# Patient Record
Sex: MALE | Race: WHITE | Employment: FULL TIME | URBAN - METROPOLITAN AREA
[De-identification: names, ages, dates, MRNs, and addresses within clinical notes are randomized per-mention and may not be internally consistent; named-entity substitution may affect disease eponyms.]

---

## 2017-03-03 PROBLEM — J30.1 SEASONAL ALLERGIC RHINITIS DUE TO POLLEN: Status: ACTIVE | Noted: 2017-03-03

## 2017-03-05 PROBLEM — R79.89 ELEVATED LFTS: Status: ACTIVE | Noted: 2017-03-05

## 2017-03-13 PROBLEM — D69.6 THROMBOCYTOPENIA (HCC): Status: ACTIVE | Noted: 2017-03-13

## 2017-08-23 PROBLEM — N52.9 ERECTILE DYSFUNCTION: Status: ACTIVE | Noted: 2017-08-23

## 2017-08-25 PROBLEM — E23.0 HYPOGONADOTROPIC HYPOGONADISM (HCC): Status: ACTIVE | Noted: 2017-08-25

## 2017-09-07 ENCOUNTER — HOSPITAL ENCOUNTER (OUTPATIENT)
Dept: MRI IMAGING | Age: 42
Discharge: HOME | End: 2017-09-07
Attending: INTERNAL MEDICINE
Payer: COMMERCIAL

## 2017-09-07 DIAGNOSIS — E23.0 HYPOGONADOTROPIC HYPOGONADISM (HCC): ICD-10-CM

## 2017-09-07 PROCEDURE — 70553 MRI BRAIN STEM W/O & W/DYE: CPT

## 2017-09-07 PROCEDURE — A9577 INJ MULTIHANCE: HCPCS | Performed by: INTERNAL MEDICINE

## 2017-09-07 PROCEDURE — 74011000258 HC RX REV CODE- 258: Performed by: INTERNAL MEDICINE

## 2017-09-07 PROCEDURE — 74011250636 HC RX REV CODE- 250/636: Performed by: INTERNAL MEDICINE

## 2017-09-07 RX ORDER — SODIUM CHLORIDE 0.9 % (FLUSH) 0.9 %
10 SYRINGE (ML) INJECTION
Status: COMPLETED | OUTPATIENT
Start: 2017-09-07 | End: 2017-09-07

## 2017-09-07 RX ADMIN — Medication 10 ML: at 16:56

## 2017-09-07 RX ADMIN — GADOBENATE DIMEGLUMINE 20 ML: 529 INJECTION, SOLUTION INTRAVENOUS at 16:55

## 2017-09-07 RX ADMIN — SODIUM CHLORIDE 100 ML: 900 INJECTION, SOLUTION INTRAVENOUS at 16:55

## 2017-12-11 PROBLEM — R53.82 CHRONIC FATIGUE: Status: ACTIVE | Noted: 2017-12-11

## 2018-09-21 PROBLEM — E66.01 SEVERE OBESITY (BMI 35.0-39.9): Status: ACTIVE | Noted: 2018-09-21

## 2019-07-22 PROBLEM — E66.01 SEVERE OBESITY (BMI 35.0-39.9): Status: RESOLVED | Noted: 2018-09-21 | Resolved: 2019-07-22

## 2019-07-22 PROBLEM — R79.89 ELEVATED LFTS: Status: RESOLVED | Noted: 2017-03-05 | Resolved: 2019-07-22

## 2020-01-29 ENCOUNTER — HOSPITAL ENCOUNTER (OUTPATIENT)
Dept: LAB | Age: 45
Discharge: HOME | End: 2020-01-29

## 2020-01-29 PROCEDURE — 88305 TISSUE EXAM BY PATHOLOGIST: CPT

## 2022-03-18 PROBLEM — J30.1 SEASONAL ALLERGIC RHINITIS DUE TO POLLEN: Status: ACTIVE | Noted: 2017-03-03

## 2022-03-19 PROBLEM — D69.6 THROMBOCYTOPENIA (HCC): Status: ACTIVE | Noted: 2017-03-13

## 2022-03-19 PROBLEM — N52.9 ERECTILE DYSFUNCTION: Status: ACTIVE | Noted: 2017-08-23

## 2022-03-19 PROBLEM — R53.82 CHRONIC FATIGUE: Status: ACTIVE | Noted: 2017-12-11

## 2022-03-20 PROBLEM — E23.0 HYPOGONADOTROPIC HYPOGONADISM (HCC): Status: ACTIVE | Noted: 2017-08-25

## 2024-06-22 ENCOUNTER — HOSPITAL ENCOUNTER (EMERGENCY)
Age: 49
Discharge: HOME | End: 2024-06-22
Attending: EMERGENCY MEDICINE
Payer: COMMERCIAL

## 2024-06-22 ENCOUNTER — APPOINTMENT (OUTPATIENT)
Dept: GENERAL RADIOLOGY | Age: 49
End: 2024-06-22
Payer: COMMERCIAL

## 2024-06-22 VITALS
TEMPERATURE: 97.6 F | BODY MASS INDEX: 32.9 KG/M2 | SYSTOLIC BLOOD PRESSURE: 132 MMHG | WEIGHT: 235 LBS | OXYGEN SATURATION: 95 % | DIASTOLIC BLOOD PRESSURE: 73 MMHG | RESPIRATION RATE: 13 BRPM | HEART RATE: 58 BPM | HEIGHT: 71 IN

## 2024-06-22 DIAGNOSIS — S81.812A LACERATION OF LEFT LOWER EXTREMITY, INITIAL ENCOUNTER: Primary | ICD-10-CM

## 2024-06-22 PROCEDURE — 2500000003 HC RX 250 WO HCPCS: Performed by: EMERGENCY MEDICINE

## 2024-06-22 PROCEDURE — 90714 TD VACC NO PRESV 7 YRS+ IM: CPT | Performed by: EMERGENCY MEDICINE

## 2024-06-22 PROCEDURE — 6360000002 HC RX W HCPCS: Performed by: EMERGENCY MEDICINE

## 2024-06-22 PROCEDURE — 73590 X-RAY EXAM OF LOWER LEG: CPT

## 2024-06-22 PROCEDURE — 6370000000 HC RX 637 (ALT 250 FOR IP): Performed by: EMERGENCY MEDICINE

## 2024-06-22 PROCEDURE — 90471 IMMUNIZATION ADMIN: CPT | Performed by: EMERGENCY MEDICINE

## 2024-06-22 PROCEDURE — 12034 INTMD RPR S/TR/EXT 7.6-12.5: CPT

## 2024-06-22 PROCEDURE — 99284 EMERGENCY DEPT VISIT MOD MDM: CPT

## 2024-06-22 RX ORDER — CEPHALEXIN 500 MG/1
500 CAPSULE ORAL
Status: COMPLETED | OUTPATIENT
Start: 2024-06-22 | End: 2024-06-22

## 2024-06-22 RX ORDER — LIDOCAINE HYDROCHLORIDE AND EPINEPHRINE 10; 10 MG/ML; UG/ML
10 INJECTION, SOLUTION INFILTRATION; PERINEURAL ONCE
Status: COMPLETED | OUTPATIENT
Start: 2024-06-22 | End: 2024-06-22

## 2024-06-22 RX ORDER — OMEPRAZOLE 20 MG/1
20 CAPSULE, DELAYED RELEASE ORAL DAILY
COMMUNITY

## 2024-06-22 RX ORDER — CEPHALEXIN 500 MG/1
500 CAPSULE ORAL 3 TIMES DAILY
Qty: 21 CAPSULE | Refills: 0 | Status: SHIPPED | OUTPATIENT
Start: 2024-06-22 | End: 2024-06-29

## 2024-06-22 RX ORDER — MORPHINE SULFATE 15 MG/1
7.5 TABLET ORAL EVERY 6 HOURS PRN
Qty: 6 TABLET | Refills: 0 | Status: SHIPPED | OUTPATIENT
Start: 2024-06-22 | End: 2024-06-25

## 2024-06-22 RX ORDER — GINSENG 100 MG
CAPSULE ORAL
Status: COMPLETED | OUTPATIENT
Start: 2024-06-22 | End: 2024-06-22

## 2024-06-22 RX ADMIN — CLOSTRIDIUM TETANI TOXOID ANTIGEN (FORMALDEHYDE INACTIVATED) AND CORYNEBACTERIUM DIPHTHERIAE TOXOID ANTIGEN (FORMALDEHYDE INACTIVATED) 0.5 ML: 5; 2 INJECTION, SUSPENSION INTRAMUSCULAR at 08:03

## 2024-06-22 RX ADMIN — LIDOCAINE HYDROCHLORIDE,EPINEPHRINE BITARTRATE 10 ML: 10; .01 INJECTION, SOLUTION INFILTRATION; PERINEURAL at 08:05

## 2024-06-22 RX ADMIN — CEPHALEXIN 500 MG: 500 CAPSULE ORAL at 08:02

## 2024-06-22 RX ADMIN — BACITRACIN: 500 OINTMENT TOPICAL at 09:45

## 2024-06-22 ASSESSMENT — PAIN SCALES - GENERAL
PAINLEVEL_OUTOF10: 5
PAINLEVEL_OUTOF10: 5

## 2024-06-22 ASSESSMENT — LIFESTYLE VARIABLES
HOW OFTEN DO YOU HAVE A DRINK CONTAINING ALCOHOL: NEVER
HOW MANY STANDARD DRINKS CONTAINING ALCOHOL DO YOU HAVE ON A TYPICAL DAY: PATIENT DOES NOT DRINK

## 2024-06-22 ASSESSMENT — PAIN - FUNCTIONAL ASSESSMENT: PAIN_FUNCTIONAL_ASSESSMENT: 0-10

## 2024-06-22 ASSESSMENT — ENCOUNTER SYMPTOMS: COLOR CHANGE: 0

## 2024-06-22 NOTE — DISCHARGE INSTRUCTIONS
Antibiotic as prescribed for 7 days keep wound dry for 24 hours then clean twice daily with antibacterial hand soap and water, dry, apply over-the-counter bacitracin ointment and cover with a nonadherent Band-Aid or bandage.  Return if signs of infection with increased pain, redness, warmth or pus from the wound.  Alternate Tylenol and Motrin every 3-4 hours for pain.  Morphine every 6-8 hours as needed for severe breakthrough pain only-1/2 tablet every 6-8 hours.  Return here with your primary care doctor in 10 to 14 days for suture removal.

## 2024-06-22 NOTE — ED PROVIDER NOTES
XR TIBIA FIBULA LEFT (2 VIEWS)    Apply dressing        Medications given during this emergency department visit:  Medications   bacitracin ointment (has no administration in time range)   tetanus & diphtheria toxoids (adult) 5-2 LFU injection 0.5 mL (0.5 mLs IntraMUSCular Given 24 0803)   cephALEXin (KEFLEX) capsule 500 mg (500 mg Oral Given 24 0802)   lidocaine-EPINEPHrine 1 %-1:[id number] injection 10 mL (10 mLs IntraDERmal Given 24 0805)       New Prescriptions    CEPHALEXIN (KEFLEX) 500 MG CAPSULE    Take 1 capsule by mouth 3 times daily for 7 days    MORPHINE (MSIR) 15 MG TABLET    Take 0.5 tablets by mouth every 6 hours as needed for Pain for up to 3 days. Max Daily Amount: 30 mg        Past Medical History:   Diagnosis Date    ADD (attention deficit disorder)     Chronic ITP (idiopathic thrombocytopenia) ([facility])     Dysphagia     EGD 20. esophagitis. dilatation    Idiopathic gout     Male hypogonadism     Mixed hyperlipidemia         Past Surgical History:   Procedure Laterality Date    ORTHOPEDIC SURGERY  10/2015    left biceps tendon repair        Social History     Socioeconomic History    Marital status:    Tobacco Use    Smoking status: Former     Current packs/day: 0.00     Types: Cigarettes     Quit date: 3/3/2007     Years since quittin.3    Smokeless tobacco: Never   Substance and Sexual Activity    Alcohol use: Yes     Alcohol/week: 12.0 standard drinks of alcohol    Drug use: No        Previous Medications    AMPHETAMINE-DEXTROAMPHETAMINE (ADDERALL) 30 MG TABLET    Take 1 tablet by mouth 2 times daily.    BETAMETHASONE VALERATE (VALISONE) 0.1 % CREAM    Apply topically 2 times daily as needed    OMEPRAZOLE (PRILOSEC) 20 MG DELAYED RELEASE CAPSULE    Take 1 capsule by mouth daily    PANTOPRAZOLE (PROTONIX) 40 MG TABLET    Take 40 mg by mouth daily        Results for orders placed or performed during the hospital encounter of 24   XR TIBIA FIBULA LEFT (2 VIEWS)     Narrative    Left Tib/fib    INDICATION:Trauma      COMPARISON:  None      TECHNIQUE: AP and lateral views of the left tibia and fibula were obtained.    FINDINGS: Large soft tissue defect in the medial aspect of the left calf. No  bony abnormality.      Impression    Large soft tissue defect from chainsaw injury medial left calf. No  bony injury.    Electronically signed by Yonny Wright         XR TIBIA FIBULA LEFT (2 VIEWS)   Final Result   Large soft tissue defect from chainsaw injury medial left calf. No   bony injury.      Electronically signed by Yonny Wright                   No results for input(s): \"COVID19\" in the last 72 hours.    Voice dictation software was used during the making of this note.  This software is not perfect and grammatical and other typographical errors may be present.  This note has not been completely proofread for errors.     Buck Hope MD  06/22/24 [id number]

## 2024-06-22 NOTE — ED TRIAGE NOTES
Pt ambulatory to ED with spouse with laceration to left lower leg after chainsaw kicked back on him this morning. Unknown last tetanus. Bleeding controlled at this time.